# Patient Record
Sex: FEMALE | Race: WHITE | NOT HISPANIC OR LATINO | Employment: FULL TIME | URBAN - METROPOLITAN AREA
[De-identification: names, ages, dates, MRNs, and addresses within clinical notes are randomized per-mention and may not be internally consistent; named-entity substitution may affect disease eponyms.]

---

## 2021-10-18 ENCOUNTER — HISTORICAL (OUTPATIENT)
Dept: LAB | Facility: HOSPITAL | Age: 31
End: 2021-10-18

## 2021-10-18 LAB
ABS NEUT (OLG): 3.57
ALBUMIN SERPL-MCNC: 3.9 GM/DL (ref 3.5–5)
ALBUMIN/GLOB SERPL: 1.1 RATIO (ref 1.1–2)
ALP SERPL-CCNC: 82 UNIT/L (ref 40–150)
ALT SERPL-CCNC: 12 UNIT/L (ref 0–55)
APPEARANCE, UA: ABNORMAL
AST SERPL-CCNC: 14 UNIT/L (ref 5–34)
BACTERIA SPEC CULT: ABNORMAL
BASOPHILS # BLD AUTO: 0.02 X10(3)/MCL
BASOPHILS NFR BLD AUTO: 0.3 %
BILIRUB SERPL-MCNC: 0.5 MG/DL
BILIRUB UR QL STRIP: NEGATIVE
BILIRUBIN DIRECT+TOT PNL SERPL-MCNC: 0.2 MG/DL (ref 0–0.5)
BILIRUBIN DIRECT+TOT PNL SERPL-MCNC: 0.3 MG/DL
BUN SERPL-MCNC: 10 MG/DL (ref 7–18.7)
CALCIUM SERPL-MCNC: 9.8 MG/DL (ref 8.4–10.2)
CHLORIDE SERPL-SCNC: 106 MMOL/L (ref 98–107)
CHOLEST SERPL-MCNC: 161 MG/DL
CHOLEST/HDLC SERPL: 3 {RATIO} (ref 0–5)
CO2 SERPL-SCNC: 25 MEQ/L (ref 22–29)
COLOR UR: YELLOW
CREAT SERPL-MCNC: 0.77 MG/DL (ref 0.55–1.02)
DEPRECATED CALCIDIOL+CALCIFEROL SERPL-MC: 31.9 NG/ML (ref 30–80)
EOSINOPHIL # BLD AUTO: 0.3 X10(3)/MCL
EOSINOPHIL NFR BLD AUTO: 4.9 %
ERYTHROCYTE [DISTWIDTH] IN BLOOD BY AUTOMATED COUNT: 14 %
EST. AVERAGE GLUCOSE BLD GHB EST-MCNC: 97 MG/DL
GLOBULIN SER-MCNC: 3.6 GM/DL (ref 2.4–3.5)
GLUCOSE (UA): NEGATIVE
GLUCOSE SERPL-MCNC: 99 MG/DL (ref 74–100)
HBA1C MFR BLD: 5 % (ref 4–6)
HCT VFR BLD AUTO: 35.9 % (ref 34–46)
HDLC SERPL-MCNC: 61 MG/DL (ref 35–60)
HGB BLD-MCNC: 11.4 GM/DL (ref 11.3–15.4)
HGB UR QL STRIP: NEGATIVE
IMM GRANULOCYTES # BLD AUTO: 0.01 10*3/UL (ref 0–0.1)
IMM GRANULOCYTES NFR BLD AUTO: 0.2 % (ref 0–1)
KETONES UR QL STRIP: NEGATIVE
LDLC SERPL CALC-MCNC: 87 MG/DL (ref 50–140)
LEUKOCYTE ESTERASE UR QL STRIP: NEGATIVE
LYMPHOCYTES # BLD AUTO: 1.85 X10(3)/MCL
LYMPHOCYTES NFR BLD AUTO: 30.1 %
MCH RBC QN AUTO: 27.7 PG (ref 27–33)
MCHC RBC AUTO-ENTMCNC: 31.8 GM/DL (ref 32–35)
MCV RBC AUTO: 87.1 FL (ref 81–97)
MONOCYTES # BLD AUTO: 0.4 X10(3)/MCL
MONOCYTES NFR BLD AUTO: 6.5 %
NEUTROPHILS # BLD AUTO: 3.57 X10(3)/MCL
NEUTROPHILS NFR BLD AUTO: 58 %
NITRITE UR QL STRIP: NEGATIVE
PH UR STRIP: 6.5 [PH] (ref 4.6–8)
PLATELET # BLD AUTO: 338 X10(3)/MCL (ref 140–450)
PMV BLD AUTO: 10 FL
POTASSIUM SERPL-SCNC: 4.3 MMOL/L (ref 3.5–5.1)
PROT SERPL-MCNC: 7.5 GM/DL (ref 6.4–8.3)
PROT UR QL STRIP: NEGATIVE
RBC # BLD AUTO: 4.12 X10(6)/MCL (ref 3.9–5)
RBC #/AREA URNS HPF: ABNORMAL /HPF
SODIUM SERPL-SCNC: 140 MMOL/L (ref 136–145)
SP GR UR STRIP: 1.02 (ref 1–1.03)
SQUAMOUS EPITHELIAL, UA: ABNORMAL
TRIGL SERPL-MCNC: 64 MG/DL (ref 37–140)
TSH SERPL-ACNC: 1.66 UIU/ML (ref 0.35–4.94)
UROBILINOGEN UR STRIP-ACNC: 1
VLDLC SERPL CALC-MCNC: 13 MG/DL
WBC # SPEC AUTO: 6.15 X10(3)/MCL (ref 3.4–9.2)
WBC #/AREA URNS HPF: ABNORMAL /HPF

## 2021-11-24 LAB
HUMAN PAPILLOMAVIRUS (HPV): NORMAL
PAP RECOMMENDATION EXT: NORMAL
PAP SMEAR: NORMAL

## 2022-04-10 ENCOUNTER — HISTORICAL (OUTPATIENT)
Dept: ADMINISTRATIVE | Facility: HOSPITAL | Age: 32
End: 2022-04-10

## 2022-04-28 VITALS
HEIGHT: 66 IN | SYSTOLIC BLOOD PRESSURE: 113 MMHG | BODY MASS INDEX: 43.15 KG/M2 | WEIGHT: 268.5 LBS | DIASTOLIC BLOOD PRESSURE: 75 MMHG | OXYGEN SATURATION: 98 %

## 2022-08-26 ENCOUNTER — HOSPITAL ENCOUNTER (EMERGENCY)
Facility: HOSPITAL | Age: 32
Discharge: HOME | End: 2022-08-27
Attending: STUDENT IN AN ORGANIZED HEALTH CARE EDUCATION/TRAINING PROGRAM
Payer: MEDICAID

## 2022-08-26 VITALS
WEIGHT: 250 LBS | RESPIRATION RATE: 16 BRPM | HEIGHT: 66 IN | BODY MASS INDEX: 40.18 KG/M2 | DIASTOLIC BLOOD PRESSURE: 99 MMHG | HEART RATE: 88 BPM | OXYGEN SATURATION: 98 % | TEMPERATURE: 98 F | SYSTOLIC BLOOD PRESSURE: 128 MMHG

## 2022-08-26 DIAGNOSIS — M25.562 ACUTE PAIN OF LEFT KNEE: Primary | ICD-10-CM

## 2022-08-26 DIAGNOSIS — M25.562 LEFT KNEE PAIN: ICD-10-CM

## 2022-08-26 PROCEDURE — 86140 C-REACTIVE PROTEIN: CPT | Performed by: STUDENT IN AN ORGANIZED HEALTH CARE EDUCATION/TRAINING PROGRAM

## 2022-08-26 PROCEDURE — 36415 COLL VENOUS BLD VENIPUNCTURE: CPT | Performed by: STUDENT IN AN ORGANIZED HEALTH CARE EDUCATION/TRAINING PROGRAM

## 2022-08-26 PROCEDURE — 80053 COMPREHEN METABOLIC PANEL: CPT | Performed by: STUDENT IN AN ORGANIZED HEALTH CARE EDUCATION/TRAINING PROGRAM

## 2022-08-26 PROCEDURE — 85651 RBC SED RATE NONAUTOMATED: CPT | Performed by: STUDENT IN AN ORGANIZED HEALTH CARE EDUCATION/TRAINING PROGRAM

## 2022-08-26 PROCEDURE — 99284 EMERGENCY DEPT VISIT MOD MDM: CPT | Mod: 25

## 2022-08-26 PROCEDURE — 85025 COMPLETE CBC W/AUTO DIFF WBC: CPT | Performed by: STUDENT IN AN ORGANIZED HEALTH CARE EDUCATION/TRAINING PROGRAM

## 2022-08-27 LAB
ALBUMIN SERPL-MCNC: 3.7 GM/DL (ref 3.5–5)
ALBUMIN/GLOB SERPL: 1.2 RATIO (ref 1.1–2)
ALP SERPL-CCNC: 84 UNIT/L (ref 40–150)
ALT SERPL-CCNC: 14 UNIT/L (ref 0–55)
AST SERPL-CCNC: 12 UNIT/L (ref 5–34)
BASOPHILS # BLD AUTO: 0.04 X10(3)/MCL (ref 0–0.2)
BASOPHILS NFR BLD AUTO: 0.4 %
BILIRUBIN DIRECT+TOT PNL SERPL-MCNC: 0.4 MG/DL
BUN SERPL-MCNC: 11 MG/DL (ref 7–18.7)
CALCIUM SERPL-MCNC: 9.8 MG/DL (ref 8.4–10.2)
CHLORIDE SERPL-SCNC: 106 MMOL/L (ref 98–107)
CO2 SERPL-SCNC: 26 MMOL/L (ref 22–29)
CREAT SERPL-MCNC: 0.76 MG/DL (ref 0.55–1.02)
CRP SERPL-MCNC: 5.7 MG/L
EOSINOPHIL # BLD AUTO: 0.29 X10(3)/MCL (ref 0–0.9)
EOSINOPHIL NFR BLD AUTO: 3.1 %
ERYTHROCYTE [DISTWIDTH] IN BLOOD BY AUTOMATED COUNT: 13.2 % (ref 11.5–17)
ERYTHROCYTE [SEDIMENTATION RATE] IN BLOOD: 16 MM/HR (ref 0–20)
GFR SERPLBLD CREATININE-BSD FMLA CKD-EPI: >60 MLS/MIN/1.73/M2
GLOBULIN SER-MCNC: 3.2 GM/DL (ref 2.4–3.5)
GLUCOSE SERPL-MCNC: 130 MG/DL (ref 74–100)
HCT VFR BLD AUTO: 36.5 % (ref 37–47)
HGB BLD-MCNC: 12.1 GM/DL (ref 12–16)
IMM GRANULOCYTES # BLD AUTO: 0.03 X10(3)/MCL (ref 0–0.04)
IMM GRANULOCYTES NFR BLD AUTO: 0.3 %
LYMPHOCYTES # BLD AUTO: 2.52 X10(3)/MCL (ref 0.6–4.6)
LYMPHOCYTES NFR BLD AUTO: 27.2 %
MCH RBC QN AUTO: 27.8 PG (ref 27–31)
MCHC RBC AUTO-ENTMCNC: 33.2 MG/DL (ref 33–36)
MCV RBC AUTO: 83.7 FL (ref 80–94)
MONOCYTES # BLD AUTO: 0.58 X10(3)/MCL (ref 0.1–1.3)
MONOCYTES NFR BLD AUTO: 6.3 %
NEUTROPHILS # BLD AUTO: 5.8 X10(3)/MCL (ref 2.1–9.2)
NEUTROPHILS NFR BLD AUTO: 62.7 %
NRBC BLD AUTO-RTO: 0 %
PLATELET # BLD AUTO: 312 X10(3)/MCL (ref 130–400)
PMV BLD AUTO: 9.8 FL (ref 7.4–10.4)
POTASSIUM SERPL-SCNC: 4 MMOL/L (ref 3.5–5.1)
PROT SERPL-MCNC: 6.9 GM/DL (ref 6.4–8.3)
RBC # BLD AUTO: 4.36 X10(6)/MCL (ref 4.2–5.4)
SODIUM SERPL-SCNC: 142 MMOL/L (ref 136–145)
WBC # SPEC AUTO: 9.3 X10(3)/MCL (ref 4.5–11.5)

## 2022-08-27 PROCEDURE — 25000003 PHARM REV CODE 250: Performed by: STUDENT IN AN ORGANIZED HEALTH CARE EDUCATION/TRAINING PROGRAM

## 2022-08-27 RX ORDER — IBUPROFEN 600 MG/1
600 TABLET ORAL
Status: COMPLETED | OUTPATIENT
Start: 2022-08-27 | End: 2022-08-27

## 2022-08-27 RX ORDER — LIDOCAINE 50 MG/G
1 PATCH TOPICAL
Status: DISCONTINUED | OUTPATIENT
Start: 2022-08-27 | End: 2022-08-27 | Stop reason: HOSPADM

## 2022-08-27 RX ADMIN — IBUPROFEN 600 MG: 600 TABLET ORAL at 12:08

## 2022-08-27 RX ADMIN — LIDOCAINE 5% 1 PATCH: 700 PATCH TOPICAL at 12:08

## 2022-08-27 NOTE — DISCHARGE INSTRUCTIONS
You were seen today for a traumatic pain to the left knee.  X-ray did not show any evidence of dislocation or fracture.  Your blood work was unremarkable.  Your given ibuprofen and a lidocaine patch.  Please follow-up with your primary care physician early this coming week for recheck.  If he began running a fever, having difficulty moving her left knee, have any skin changes to left knee, please return to the emergency department immediately.  If you experience any new or concerning symptoms please return immediately.

## 2022-12-09 ENCOUNTER — DOCUMENTATION ONLY (OUTPATIENT)
Dept: FAMILY MEDICINE | Facility: CLINIC | Age: 32
End: 2022-12-09

## 2022-12-09 ENCOUNTER — OFFICE VISIT (OUTPATIENT)
Dept: FAMILY MEDICINE | Facility: CLINIC | Age: 32
End: 2022-12-09
Payer: MEDICAID

## 2022-12-09 VITALS
HEART RATE: 80 BPM | DIASTOLIC BLOOD PRESSURE: 88 MMHG | WEIGHT: 260 LBS | SYSTOLIC BLOOD PRESSURE: 132 MMHG | BODY MASS INDEX: 41.78 KG/M2 | HEIGHT: 66 IN | RESPIRATION RATE: 20 BRPM | TEMPERATURE: 97 F | OXYGEN SATURATION: 99 %

## 2022-12-09 DIAGNOSIS — Z00.00 ROUTINE GENERAL MEDICAL EXAMINATION AT A HEALTH CARE FACILITY: Primary | ICD-10-CM

## 2022-12-09 DIAGNOSIS — F41.1 GENERALIZED ANXIETY DISORDER: ICD-10-CM

## 2022-12-09 DIAGNOSIS — F33.9 EPISODE OF RECURRENT MAJOR DEPRESSIVE DISORDER, UNSPECIFIED DEPRESSION EPISODE SEVERITY: ICD-10-CM

## 2022-12-09 PROBLEM — F41.9 ANXIETY: Status: RESOLVED | Noted: 2019-01-30 | Resolved: 2022-12-09

## 2022-12-09 PROBLEM — F41.9 ANXIETY: Status: ACTIVE | Noted: 2019-01-30

## 2022-12-09 PROBLEM — E66.9 OBESITY: Status: ACTIVE | Noted: 2022-12-09

## 2022-12-09 PROBLEM — R53.83 FATIGUE: Status: ACTIVE | Noted: 2022-12-09

## 2022-12-09 PROBLEM — F39 MOOD DISORDER: Status: ACTIVE | Noted: 2022-12-09

## 2022-12-09 PROCEDURE — 1160F RVW MEDS BY RX/DR IN RCRD: CPT | Mod: CPTII,,, | Performed by: FAMILY MEDICINE

## 2022-12-09 PROCEDURE — 1159F PR MEDICATION LIST DOCUMENTED IN MEDICAL RECORD: ICD-10-PCS | Mod: CPTII,,, | Performed by: FAMILY MEDICINE

## 2022-12-09 PROCEDURE — 99395 PR PREVENTIVE VISIT,EST,18-39: ICD-10-PCS | Mod: ,,, | Performed by: FAMILY MEDICINE

## 2022-12-09 PROCEDURE — 3008F PR BODY MASS INDEX (BMI) DOCUMENTED: ICD-10-PCS | Mod: CPTII,,, | Performed by: FAMILY MEDICINE

## 2022-12-09 PROCEDURE — 3075F SYST BP GE 130 - 139MM HG: CPT | Mod: CPTII,,, | Performed by: FAMILY MEDICINE

## 2022-12-09 PROCEDURE — 3079F PR MOST RECENT DIASTOLIC BLOOD PRESSURE 80-89 MM HG: ICD-10-PCS | Mod: CPTII,,, | Performed by: FAMILY MEDICINE

## 2022-12-09 PROCEDURE — 1160F PR REVIEW ALL MEDS BY PRESCRIBER/CLIN PHARMACIST DOCUMENTED: ICD-10-PCS | Mod: CPTII,,, | Performed by: FAMILY MEDICINE

## 2022-12-09 PROCEDURE — 3079F DIAST BP 80-89 MM HG: CPT | Mod: CPTII,,, | Performed by: FAMILY MEDICINE

## 2022-12-09 PROCEDURE — 3008F BODY MASS INDEX DOCD: CPT | Mod: CPTII,,, | Performed by: FAMILY MEDICINE

## 2022-12-09 PROCEDURE — 3075F PR MOST RECENT SYSTOLIC BLOOD PRESS GE 130-139MM HG: ICD-10-PCS | Mod: CPTII,,, | Performed by: FAMILY MEDICINE

## 2022-12-09 PROCEDURE — 99395 PREV VISIT EST AGE 18-39: CPT | Mod: ,,, | Performed by: FAMILY MEDICINE

## 2022-12-09 PROCEDURE — 1159F MED LIST DOCD IN RCRD: CPT | Mod: CPTII,,, | Performed by: FAMILY MEDICINE

## 2022-12-09 RX ORDER — CARIPRAZINE 1.5 MG/1
1.5 CAPSULE, GELATIN COATED ORAL EVERY MORNING
COMMUNITY
Start: 2022-11-11

## 2022-12-09 RX ORDER — BUPROPION HYDROCHLORIDE 300 MG/1
300 TABLET ORAL EVERY MORNING
COMMUNITY
Start: 2022-11-11

## 2022-12-09 NOTE — PROGRESS NOTES
Patient ID: [id number]     Chief Complaint: wellness        HPI:     Ashley Barrett is a 32 y.o. female here today for an annual wellness visit. Reviewed and discussed lab results. Overall she feels well. No other complaints today.         ----------------------------  Anxiety disorder, unspecified  Major depression, recurrent  Mood disorder     Past Surgical History:   Procedure Laterality Date    TONSILLECTOMY  1995       Review of patient's allergies indicates:   Allergen Reactions    Coconut        Outpatient Medications Marked as Taking for the 12/9/22 encounter (Office Visit) with James Vaca DO   Medication Sig Dispense Refill    buPROPion (WELLBUTRIN XL) 300 MG 24 hr tablet Take 300 mg by mouth every morning.      VRAYLAR 1.5 mg Cap Take 1.5 mg by mouth every morning.         Social History     Socioeconomic History    Marital status: Single   Tobacco Use    Smoking status: Every Day     Types: Cigarettes    Smokeless tobacco: Never   Substance and Sexual Activity    Alcohol use: Yes    Drug use: Never    Sexual activity: Yes        Family History   Problem Relation Age of Onset    Hypertension Mother     Hyperlipidemia Mother     Hypertension Father     Hyperlipidemia Father     Bipolar disorder Brother     Schizophrenia Brother         Patient Care Team:  James Vaca DO as PCP - General (Family Medicine)  Dante Leon, PhD (Psychiatry)       Subjective:     Review of Systems   Constitutional:  Negative for chills and fever.   Respiratory:  Negative for shortness of breath.    Cardiovascular:  Negative for chest pain and palpitations.   Gastrointestinal:  Negative for constipation, diarrhea and heartburn.   Musculoskeletal:  Positive for back pain. Negative for myalgias.   Neurological:  Negative for dizziness and headaches.   Psychiatric/Behavioral:  Negative for depression. The patient is not nervous/anxious and does not have insomnia.      See HPI for details      All Other ROS: Negative  "except as stated in HPI.       Objective:     /88 (BP Location: Left arm, Patient Position: Sitting, BP Method: Large (Manual))   Pulse 80   Temp 97.1 °F (36.2 °C)   Resp 20   Ht 5' 6" (1.676 m)   Wt 117.9 kg (260 lb)   SpO2 99%   BMI 41.97 kg/m²     Physical Exam  Vitals reviewed.   Constitutional:       General: She is not in acute distress.     Appearance: Normal appearance.   Cardiovascular:      Rate and Rhythm: Normal rate and regular rhythm.      Heart sounds: No murmur heard.    No friction rub. No gallop.   Pulmonary:      Effort: No respiratory distress.      Breath sounds: No wheezing, rhonchi or rales.   Musculoskeletal:         General: No swelling, tenderness or deformity.      Right lower leg: No edema.      Left lower leg: No edema.   Skin:     General: Skin is warm and dry.      Findings: No lesion or rash.   Neurological:      General: No focal deficit present.      Mental Status: She is alert.   Psychiatric:         Mood and Affect: Mood normal.           Assessment:       ICD-10-CM ICD-9-CM   1. Routine general medical examination at a health care facility  Z00.00 V70.0   2. Generalized anxiety disorder  F41.1 300.02   3. Episode of recurrent major depressive disorder, unspecified depression episode severity  F33.9 296.30        Plan:         Health Maintenance Topics with due status: Not Due       Topic Last Completion Date    TETANUS VACCINE 04/10/2020    Cervical Cancer Screening 11/18/2021        Cervical Cancer Screening - Last Pap on 11/18/21. Next due 11/18/26.   Eye Exam - Recommend annually.    Dental Exam - Recommend biannual exams.     Vaccinations -   Immunization History   Administered Date(s) Administered    COVID-19, MRNA, LN-S, PF (Pfizer) (Purple Cap) 10/11/2021    DTP 1989, 1989, 01/06/1990, 10/16/1990, 03/29/1994    HIB 10/16/1990    Hepatitis B, Pediatric/Adolescent 09/20/2004, 11/08/2004, 01/24/2005    HiB PRP-T 10/19/1990    Influenza - Quadrivalent " 10/18/2021    MMR 10/16/1990, 03/29/1994    OPV 1989, 1989, 10/16/1990, 03/29/1994    PPD Test 11/10/1998, 01/03/2000    Td (ADULT) 09/20/2004, 09/20/2004    Tdap 07/18/2010, 04/10/2020            1. Routine general medical examination at a health care facility    2. Generalized anxiety disorder    3. Episode of recurrent major depressive disorder, unspecified depression episode severity  -Doing well on current medications for anxiety and depression.     Medication List with Changes/Refills   Current Medications    BUPROPION (WELLBUTRIN XL) 300 MG 24 HR TABLET    Take 300 mg by mouth every morning.       Start Date: 11/11/2022End Date: --    VRAYLAR 1.5 MG CAP    Take 1.5 mg by mouth every morning.       Start Date: 11/11/2022End Date: --          The patient's Health Maintenance was reviewed and the following appears to be due at this time:   Health Maintenance Due   Topic Date Due    Hepatitis C Screening  Never done    Pneumococcal Vaccines (Age 0-64) (1 - PCV) Never done    HIV Screening  Never done    COVID-19 Vaccine (2 - Pfizer series) 11/01/2021    Influenza Vaccine (1) 09/01/2022         Follow up in about 6 months (around 6/9/2023) for Follow up. In addition to their scheduled follow up, the patient has also been instructed to follow up on as needed basis.

## 2023-04-21 ENCOUNTER — HOSPITAL ENCOUNTER (EMERGENCY)
Facility: HOSPITAL | Age: 33
Discharge: HOME | End: 2023-04-21
Attending: STUDENT IN AN ORGANIZED HEALTH CARE EDUCATION/TRAINING PROGRAM
Payer: MEDICAID

## 2023-04-21 VITALS
SYSTOLIC BLOOD PRESSURE: 135 MMHG | RESPIRATION RATE: 18 BRPM | TEMPERATURE: 99 F | OXYGEN SATURATION: 100 % | HEART RATE: 88 BPM | DIASTOLIC BLOOD PRESSURE: 90 MMHG | WEIGHT: 250 LBS | HEIGHT: 66 IN | BODY MASS INDEX: 40.18 KG/M2

## 2023-04-21 DIAGNOSIS — M54.50 ACUTE LEFT-SIDED LOW BACK PAIN WITHOUT SCIATICA: Primary | ICD-10-CM

## 2023-04-21 LAB
APPEARANCE UR: CLEAR
B-HCG SERPL QL: NEGATIVE
BACTERIA #/AREA URNS AUTO: NORMAL /HPF
BILIRUB UR QL STRIP.AUTO: NEGATIVE MG/DL
COLOR UR AUTO: YELLOW
GLUCOSE UR QL STRIP.AUTO: NEGATIVE MG/DL
KETONES UR QL STRIP.AUTO: NEGATIVE MG/DL
LEUKOCYTE ESTERASE UR QL STRIP.AUTO: NEGATIVE UNIT/L
NITRITE UR QL STRIP.AUTO: NEGATIVE
PH UR STRIP.AUTO: 7 [PH]
PROT UR QL STRIP.AUTO: NEGATIVE MG/DL
RBC #/AREA URNS AUTO: NORMAL /HPF
RBC UR QL AUTO: NEGATIVE UNIT/L
SP GR UR STRIP.AUTO: 1.02
SQUAMOUS #/AREA URNS AUTO: NORMAL /HPF
UROBILINOGEN UR STRIP-ACNC: 1 MG/DL
WBC #/AREA URNS AUTO: NORMAL /HPF

## 2023-04-21 PROCEDURE — 81001 URINALYSIS AUTO W/SCOPE: CPT | Performed by: STUDENT IN AN ORGANIZED HEALTH CARE EDUCATION/TRAINING PROGRAM

## 2023-04-21 PROCEDURE — 96372 THER/PROPH/DIAG INJ SC/IM: CPT | Performed by: STUDENT IN AN ORGANIZED HEALTH CARE EDUCATION/TRAINING PROGRAM

## 2023-04-21 PROCEDURE — 99284 EMERGENCY DEPT VISIT MOD MDM: CPT

## 2023-04-21 PROCEDURE — 63600175 PHARM REV CODE 636 W HCPCS: Performed by: STUDENT IN AN ORGANIZED HEALTH CARE EDUCATION/TRAINING PROGRAM

## 2023-04-21 PROCEDURE — 81025 URINE PREGNANCY TEST: CPT | Performed by: STUDENT IN AN ORGANIZED HEALTH CARE EDUCATION/TRAINING PROGRAM

## 2023-04-21 RX ORDER — PREDNISONE 20 MG/1
20 TABLET ORAL DAILY
Qty: 5 TABLET | Refills: 0 | Status: SHIPPED | OUTPATIENT
Start: 2023-04-21 | End: 2023-04-26

## 2023-04-21 RX ORDER — KETOROLAC TROMETHAMINE 10 MG/1
10 TABLET, FILM COATED ORAL 3 TIMES DAILY
Qty: 15 TABLET | Refills: 0 | Status: SHIPPED | OUTPATIENT
Start: 2023-04-22 | End: 2023-04-27

## 2023-04-21 RX ORDER — METHOCARBAMOL 750 MG/1
1500 TABLET, FILM COATED ORAL 2 TIMES DAILY PRN
Qty: 20 TABLET | Refills: 0 | Status: SHIPPED | OUTPATIENT
Start: 2023-04-21 | End: 2023-04-26

## 2023-04-21 RX ORDER — KETOROLAC TROMETHAMINE 30 MG/ML
30 INJECTION, SOLUTION INTRAMUSCULAR; INTRAVENOUS
Status: COMPLETED | OUTPATIENT
Start: 2023-04-21 | End: 2023-04-21

## 2023-04-21 RX ADMIN — KETOROLAC TROMETHAMINE 30 MG: 30 INJECTION, SOLUTION INTRAMUSCULAR at 09:04

## 2023-04-21 NOTE — ED NOTES
Pt ambulated to room 4 with steady gait. Pt complains of lower black pain that started about two days. Pt states she lifts heavy boxes at work and states it is possibly a pulled muscle. Pt rates pain 2/10. Pt denies needs at this time.

## 2023-04-21 NOTE — ED PROVIDER NOTES
Encounter Date: 4/21/2023       History     Chief Complaint   Patient presents with    Sciatica     Pt reports pain in right lower back traveling to legs x 2 weeks.       Patient is a 32-year-old white female past medical history BPD who presented to the ER today due to left lower back pain for the last 2 weeks.  Patient denies any urinary incontinence, urinary retention, fecal incontinence, saddle anesthesia.  Patient states she lifts heavy boxes at work states she attributes her symptoms being due to that.  Patient has been trying Tylenol.  Patient points to left lower paraspinal region as the source of the pain and states that it is worse with movement and better with rest.  Patient states it sometimes radiates upwards no set time is fixed in its position.  Patient denies any urinary frequency, dysuria, hematuria.  Patient denies any other complaints.    Review of patient's allergies indicates:   Allergen Reactions    Coconut      Past Medical History:   Diagnosis Date    Anxiety disorder, unspecified     Major depression, recurrent     Mood disorder      Past Surgical History:   Procedure Laterality Date    TONSILLECTOMY  1995     Family History   Problem Relation Age of Onset    Hypertension Mother     Hyperlipidemia Mother     Hypertension Father     Hyperlipidemia Father     Bipolar disorder Brother     Schizophrenia Brother      Social History     Tobacco Use    Smoking status: Every Day     Types: Cigarettes    Smokeless tobacco: Never   Substance Use Topics    Alcohol use: Yes     Review of Systems   Constitutional:  Negative for chills, fatigue and fever.   HENT:  Negative for congestion, sore throat and trouble swallowing.    Eyes:  Negative for pain and visual disturbance.   Respiratory:  Negative for cough, shortness of breath and wheezing.    Cardiovascular:  Negative for chest pain and palpitations.   Gastrointestinal:  Negative for abdominal pain, blood in stool, constipation, diarrhea, nausea and  vomiting.   Genitourinary:  Negative for dysuria and hematuria.   Musculoskeletal:  Positive for back pain. Negative for myalgias.   Skin:  Negative for rash and wound.   Neurological:  Negative for seizures, syncope and headaches.   Psychiatric/Behavioral:  Negative for confusion. The patient is not nervous/anxious.      Physical Exam     Initial Vitals [04/21/23 0851]   BP Pulse Resp Temp SpO2   (!) 148/97 96 18 98.6 °F (37 °C) 97 %      MAP       --         Physical Exam    Nursing note and vitals reviewed.  Constitutional: She appears well-developed and well-nourished. She is not diaphoretic. No distress.   HENT:   Head: Normocephalic.   Right Ear: External ear normal.   Left Ear: External ear normal.   Nose: Nose normal.   Eyes: Conjunctivae and EOM are normal. Right eye exhibits no discharge. Left eye exhibits no discharge. No scleral icterus.   Neck:   Normal range of motion.  Cardiovascular:  Normal rate, regular rhythm and normal heart sounds.     Exam reveals no gallop and no friction rub.       No murmur heard.  Pulmonary/Chest: Breath sounds normal. No stridor. No respiratory distress. She has no wheezes. She has no rhonchi. She has no rales.   Musculoskeletal:         General: Normal range of motion.      Cervical back: Normal range of motion.      Comments: There is no C, T, L-spine tenderness no step-off lesions.  Tenderness palpation in the left lower L1-L4 paraspinal muscle region.  No obvious signs of deformities or rashes.     Neurological: She is alert.   Skin: Skin is warm. No rash noted. No erythema.   Psychiatric: She has a normal mood and affect. Her behavior is normal.       ED Course   Procedures  Labs Reviewed   HCG QUALITATIVE URINE - Normal   URINALYSIS, MICROSCOPIC - Normal   URINALYSIS, REFLEX TO URINE CULTURE          Imaging Results    None          Medications   ketorolac injection 30 mg (30 mg Intramuscular Given 4/21/23 0914)     Medical Decision Making:   Initial Assessment:    Overall well-appearing 32-year-old female stable gait does not seem to be in any notable distress  Differential Diagnosis:   Lumbar strain, sciatica, cauda equina syndrome, vertebral fracture  Clinical Tests:   Lab Tests: Ordered and Reviewed  The following lab test(s) were unremarkable: Urinalysis and UPT  ED Management:  Vital signs stable patient is afebrile  Historian is patient   Denies all red flag symptoms for cauda equina syndrome   Denies any injury thus vertebral fracture seems highly unlikely in a 32-year-old premenopausal female   All symptoms and signs on exam seem most consistent with a lumbar strain given the history as well as physical exam findings   I do think that rice therapy would benefit this patient and I conveyed this to her   Toradol given here in the emergency room and will be continued outpatient  Robaxin and prednisone also sent pharmacy   Return precautions discussed and follow up with PCP is recommended                        Clinical Impression:   Final diagnoses:  [M54.50] Acute left-sided low back pain without sciatica (Primary)        ED Disposition Condition    Discharge Stable          ED Prescriptions       Medication Sig Dispense Start Date End Date Auth. Provider    ketorolac (TORADOL) 10 mg tablet Take 1 tablet (10 mg total) by mouth 3 (three) times daily. for 5 days 15 tablet 4/22/2023 4/27/2023 Peter Nielsen MD    predniSONE (DELTASONE) 20 MG tablet Take 1 tablet (20 mg total) by mouth once daily. for 5 days 5 tablet 4/21/2023 4/26/2023 Peter Nielsen MD    methocarbamoL (ROBAXIN) 750 MG Tab Take 2 tablets (1,500 mg total) by mouth 2 (two) times daily as needed (pain). 20 tablet 4/21/2023 4/26/2023 Peter Nielsen MD          Follow-up Information       Follow up With Specialties Details Why Contact Info    [facility] - Emergency Dept Emergency Medicine  If symptoms worsen [address] [facility] [address] [phone]    James Vaca DO Family  Medicine, Hospitalist Schedule an appointment as soon as possible for a visit   [address] [facility] [address]  [phone]               Peter Nielsen MD  04/21/23 0964

## 2023-06-07 ENCOUNTER — HOSPITAL ENCOUNTER (EMERGENCY)
Facility: HOSPITAL | Age: 33
Discharge: HOME | End: 2023-06-07
Attending: FAMILY MEDICINE
Payer: MEDICAID

## 2023-06-07 VITALS
TEMPERATURE: 98 F | DIASTOLIC BLOOD PRESSURE: 99 MMHG | OXYGEN SATURATION: 100 % | BODY MASS INDEX: 43.39 KG/M2 | HEART RATE: 86 BPM | RESPIRATION RATE: 18 BRPM | WEIGHT: 270 LBS | HEIGHT: 66 IN | SYSTOLIC BLOOD PRESSURE: 137 MMHG

## 2023-06-07 DIAGNOSIS — M54.14 THORACIC RADICULOPATHY: Primary | ICD-10-CM

## 2023-06-07 PROCEDURE — 99284 EMERGENCY DEPT VISIT MOD MDM: CPT

## 2023-06-07 PROCEDURE — 63600175 PHARM REV CODE 636 W HCPCS: Performed by: FAMILY MEDICINE

## 2023-06-07 PROCEDURE — 96372 THER/PROPH/DIAG INJ SC/IM: CPT | Performed by: FAMILY MEDICINE

## 2023-06-07 RX ORDER — KETOROLAC TROMETHAMINE 30 MG/ML
60 INJECTION, SOLUTION INTRAMUSCULAR; INTRAVENOUS
Status: COMPLETED | OUTPATIENT
Start: 2023-06-07 | End: 2023-06-07

## 2023-06-07 RX ORDER — CYCLOBENZAPRINE HCL 10 MG
10 TABLET ORAL 2 TIMES DAILY PRN
Qty: 14 TABLET | Refills: 0 | Status: SHIPPED | OUTPATIENT
Start: 2023-06-07 | End: 2023-06-14

## 2023-06-07 RX ORDER — DEXAMETHASONE SODIUM PHOSPHATE 4 MG/ML
8 INJECTION, SOLUTION INTRA-ARTICULAR; INTRALESIONAL; INTRAMUSCULAR; INTRAVENOUS; SOFT TISSUE
Status: COMPLETED | OUTPATIENT
Start: 2023-06-07 | End: 2023-06-07

## 2023-06-07 RX ORDER — KETOROLAC TROMETHAMINE 10 MG/1
10 TABLET, FILM COATED ORAL EVERY 6 HOURS
Qty: 20 TABLET | Refills: 0 | Status: SHIPPED | OUTPATIENT
Start: 2023-06-07 | End: 2023-06-12

## 2023-06-07 RX ADMIN — KETOROLAC TROMETHAMINE 60 MG: 30 INJECTION, SOLUTION INTRAMUSCULAR; INTRAVENOUS at 08:06

## 2023-06-07 RX ADMIN — DEXAMETHASONE SODIUM PHOSPHATE 8 MG: 4 INJECTION, SOLUTION INTRA-ARTICULAR; INTRALESIONAL; INTRAMUSCULAR; INTRAVENOUS; SOFT TISSUE at 08:06

## 2023-06-07 NOTE — ED NOTES
Pt to Ed with c/o R upper back pain, Pt ROM WNL, reports pain started after waking up and sitting on toilet. Denies SOB/Cp

## 2023-06-07 NOTE — ED PROVIDER NOTES
Encounter Date: 6/7/2023       History     Chief Complaint   Patient presents with    Back Pain     Left shoulder blade pain, stabbing in character, started at 6 am.       This patient is a 32-year-old female who comes in with left upper back pain that is radiating below her left scapula.  Patient states that she woke up at 6:00 a.m. with severe pain after sitting on the toilet.  States that she can barely lift her left arm.  Hurts even when she takes a deep breath    The history is provided by the patient.   Review of patient's allergies indicates:   Allergen Reactions    Coconut      Past Medical History:   Diagnosis Date    Anxiety disorder, unspecified     Major depression, recurrent     Mood disorder      Past Surgical History:   Procedure Laterality Date    TONSILLECTOMY  1995     Family History   Problem Relation Age of Onset    Hypertension Mother     Hyperlipidemia Mother     Hypertension Father     Hyperlipidemia Father     Bipolar disorder Brother     Schizophrenia Brother      Social History     Tobacco Use    Smoking status: Every Day     Types: Cigarettes    Smokeless tobacco: Never   Substance Use Topics    Alcohol use: Yes     Review of Systems   Constitutional: Negative.    HENT: Negative.     Respiratory: Negative.     Cardiovascular: Negative.    Endocrine: Negative.    Musculoskeletal:  Positive for back pain.   Neurological: Negative.    Psychiatric/Behavioral: Negative.     All other systems reviewed and are negative.    Physical Exam     Initial Vitals [06/07/23 0746]   BP Pulse Resp Temp SpO2   (!) 139/110 82 18 98.4 °F (36.9 °C) 98 %      MAP       --         Physical Exam    Nursing note and vitals reviewed.  Constitutional: She appears well-developed.   HENT:   Head: Normocephalic.   Eyes: Pupils are equal, round, and reactive to light.   Neck:   Normal range of motion.  Cardiovascular:  Normal rate, regular rhythm and normal heart sounds.           Pulmonary/Chest: Breath sounds normal.    Abdominal: Abdomen is soft.   Musculoskeletal:         General: Normal range of motion.      Cervical back: Normal range of motion.        Back:      Neurological: She is alert and oriented to person, place, and time.   Skin: Skin is warm and dry.   Psychiatric: She has a normal mood and affect.       ED Course   Procedures  Labs Reviewed - No data to display       Imaging Results    None          Medications   dexAMETHasone injection 8 mg (8 mg Intramuscular Given 6/7/23 0819)   ketorolac injection 60 mg (60 mg Intramuscular Given 6/7/23 0819)     Medical Decision Making:   Initial Assessment:   Patient is a 32-year-old female who comes in with upper back pain.  Patient states that she woke up and walk to the bathroom when she sat on the toilet she would a severe pain in her upper back that radiated under her left scapula.  She could barely rates her left shoulder after that.  Patient denies trauma  Differential Diagnosis:   Thoracic radiculopathy  ED Management:  This is a classic pinched nerve for which she is given a steroid shot of Toradol shot.  She sent home with muscle relaxant and anti-inflammatory.                        Clinical Impression:   Final diagnoses:  [M54.14] Thoracic radiculopathy (Primary)        ED Disposition Condition    Discharge Stable          ED Prescriptions       Medication Sig Dispense Start Date End Date Auth. Provider    cyclobenzaprine (FLEXERIL) 10 MG tablet Take 1 tablet (10 mg total) by mouth 2 (two) times daily as needed for Muscle spasms. 14 tablet 6/7/2023 6/14/2023 Emili Faulkner MD    ketorolac (TORADOL) 10 mg tablet Take 1 tablet (10 mg total) by mouth every 6 (six) hours. for 5 days 20 tablet 6/7/2023 6/12/2023 Emili Faulkner MD          Follow-up Information       Follow up With Specialties Details Why Contact Ofelia Vaca DO Family Medicine Schedule an appointment as soon as possible for a visit   [address] [facility]  [address]  [phone]               Emili Faulnker MD  06/07/23 0936

## 2023-12-02 DIAGNOSIS — E66.9 OBESITY, UNSPECIFIED CLASSIFICATION, UNSPECIFIED OBESITY TYPE, UNSPECIFIED WHETHER SERIOUS COMORBIDITY PRESENT: Primary | ICD-10-CM

## 2023-12-02 DIAGNOSIS — Z11.3 SCREEN FOR STD (SEXUALLY TRANSMITTED DISEASE): ICD-10-CM

## 2025-06-05 ENCOUNTER — OFFICE VISIT (OUTPATIENT)
Dept: FAMILY MEDICINE | Facility: CLINIC | Age: 35
End: 2025-06-05
Payer: MEDICAID

## 2025-06-05 VITALS
HEIGHT: 66 IN | SYSTOLIC BLOOD PRESSURE: 137 MMHG | TEMPERATURE: 98 F | WEIGHT: 278 LBS | HEART RATE: 95 BPM | RESPIRATION RATE: 20 BRPM | OXYGEN SATURATION: 98 % | BODY MASS INDEX: 44.68 KG/M2 | DIASTOLIC BLOOD PRESSURE: 89 MMHG

## 2025-06-05 DIAGNOSIS — G56.03 BILATERAL CARPAL TUNNEL SYNDROME: Primary | ICD-10-CM

## 2025-06-05 RX ORDER — IBUPROFEN 800 MG/1
800 TABLET, FILM COATED ORAL 3 TIMES DAILY
Qty: 90 TABLET | Refills: 0 | Status: SHIPPED | OUTPATIENT
Start: 2025-06-05

## 2025-07-02 DIAGNOSIS — G56.03 BILATERAL CARPAL TUNNEL SYNDROME: ICD-10-CM

## 2025-07-02 RX ORDER — IBUPROFEN 800 MG/1
800 TABLET, FILM COATED ORAL 3 TIMES DAILY
Qty: 90 TABLET | Refills: 0 | Status: SHIPPED | OUTPATIENT
Start: 2025-07-02

## 2025-08-11 DIAGNOSIS — G56.03 BILATERAL CARPAL TUNNEL SYNDROME: ICD-10-CM

## 2025-08-11 RX ORDER — IBUPROFEN 800 MG/1
800 TABLET, FILM COATED ORAL 3 TIMES DAILY
Qty: 90 TABLET | Refills: 0 | Status: SHIPPED | OUTPATIENT
Start: 2025-08-11

## 2025-08-21 DIAGNOSIS — Z13.220 SCREENING CHOLESTEROL LEVEL: ICD-10-CM

## 2025-08-21 DIAGNOSIS — Z53.20 SCREENING FOR HEPATITIS C DECLINED: ICD-10-CM

## 2025-08-21 DIAGNOSIS — Z11.4 SCREENING FOR HIV (HUMAN IMMUNODEFICIENCY VIRUS): ICD-10-CM

## 2025-08-21 DIAGNOSIS — Z13.1 SCREENING FOR DIABETES MELLITUS: ICD-10-CM

## 2025-08-21 DIAGNOSIS — Z13.29 SCREENING FOR THYROID DISORDER: ICD-10-CM

## 2025-08-21 DIAGNOSIS — Z00.00 WELLNESS EXAMINATION: Primary | ICD-10-CM
